# Patient Record
Sex: FEMALE | Race: BLACK OR AFRICAN AMERICAN | NOT HISPANIC OR LATINO | URBAN - METROPOLITAN AREA
[De-identification: names, ages, dates, MRNs, and addresses within clinical notes are randomized per-mention and may not be internally consistent; named-entity substitution may affect disease eponyms.]

---

## 2017-08-18 ENCOUNTER — COMMUNICATION - HEALTHEAST (OUTPATIENT)
Dept: FAMILY MEDICINE | Facility: CLINIC | Age: 27
End: 2017-08-18

## 2017-08-18 ENCOUNTER — OFFICE VISIT - HEALTHEAST (OUTPATIENT)
Dept: FAMILY MEDICINE | Facility: CLINIC | Age: 27
End: 2017-08-18

## 2017-08-18 ENCOUNTER — HOSPITAL ENCOUNTER (OUTPATIENT)
Dept: ULTRASOUND IMAGING | Facility: CLINIC | Age: 27
Discharge: HOME | End: 2017-08-18
Attending: NURSE PRACTITIONER

## 2017-08-18 DIAGNOSIS — Z13.220 LIPID SCREENING: ICD-10-CM

## 2017-08-18 DIAGNOSIS — R10.2 PELVIC PAIN: ICD-10-CM

## 2017-08-18 DIAGNOSIS — E66.3 OVERWEIGHT: ICD-10-CM

## 2017-08-18 DIAGNOSIS — B96.89 BACTERIAL VAGINOSIS: ICD-10-CM

## 2017-08-18 DIAGNOSIS — N76.0 BACTERIAL VAGINOSIS: ICD-10-CM

## 2017-08-18 DIAGNOSIS — Z00.00 ROUTINE GENERAL MEDICAL EXAMINATION AT A HEALTH CARE FACILITY: ICD-10-CM

## 2017-08-18 DIAGNOSIS — Z11.3 SCREENING FOR STD (SEXUALLY TRANSMITTED DISEASE): ICD-10-CM

## 2017-08-18 DIAGNOSIS — Z13.1 DIABETES MELLITUS SCREENING: ICD-10-CM

## 2017-08-18 LAB
CHOLEST SERPL-MCNC: 190 MG/DL
FASTING STATUS PATIENT QL REPORTED: YES
HDLC SERPL-MCNC: 56 MG/DL
HIV 1+2 AB+HIV1 P24 AG SERPL QL IA: NEGATIVE
LDLC SERPL CALC-MCNC: 123 MG/DL
TRIGL SERPL-MCNC: 56 MG/DL

## 2017-08-18 ASSESSMENT — MIFFLIN-ST. JEOR: SCORE: 1434.81

## 2017-08-21 LAB — SYPHILIS RPR SCREEN - HISTORICAL: NORMAL

## 2017-08-27 ENCOUNTER — AMBULATORY - HEALTHEAST (OUTPATIENT)
Dept: FAMILY MEDICINE | Facility: CLINIC | Age: 27
End: 2017-08-27

## 2017-08-27 DIAGNOSIS — R10.2 PELVIC PAIN: ICD-10-CM

## 2017-08-28 ENCOUNTER — COMMUNICATION - HEALTHEAST (OUTPATIENT)
Dept: FAMILY MEDICINE | Facility: CLINIC | Age: 27
End: 2017-08-28

## 2017-08-28 LAB
BKR LAB AP ABNORMAL BLEEDING: NO
BKR LAB AP BIRTH CONTROL/HORMONES: NORMAL
BKR LAB AP CERVICAL APPEARANCE: NORMAL
BKR LAB AP GYN ADEQUACY: NORMAL
BKR LAB AP GYN INTERPRETATION: NORMAL
BKR LAB AP GYN OTHER FINDINGS: NORMAL
BKR LAB AP HPV REFLEX: NORMAL
BKR LAB AP LMP: NORMAL
BKR LAB AP PATIENT STATUS: NORMAL
BKR LAB AP PREVIOUS ABNORMAL: NORMAL
BKR LAB AP PREVIOUS NORMAL: NORMAL
HIGH RISK?: NO
PATH REPORT.COMMENTS IMP SPEC: NORMAL
RESULT FLAG (HE HISTORICAL CONVERSION): NORMAL

## 2017-08-29 ENCOUNTER — COMMUNICATION - HEALTHEAST (OUTPATIENT)
Dept: FAMILY MEDICINE | Facility: CLINIC | Age: 27
End: 2017-08-29

## 2018-02-20 ENCOUNTER — OFFICE VISIT - HEALTHEAST (OUTPATIENT)
Dept: FAMILY MEDICINE | Facility: CLINIC | Age: 28
End: 2018-02-20

## 2018-02-20 ENCOUNTER — COMMUNICATION - HEALTHEAST (OUTPATIENT)
Dept: FAMILY MEDICINE | Facility: CLINIC | Age: 28
End: 2018-02-20

## 2018-02-20 ENCOUNTER — RECORDS - HEALTHEAST (OUTPATIENT)
Dept: ADMINISTRATIVE | Facility: OTHER | Age: 28
End: 2018-02-20

## 2018-02-20 DIAGNOSIS — B96.89 BACTERIAL VAGINOSIS: ICD-10-CM

## 2018-02-20 DIAGNOSIS — Z11.3 SCREENING FOR STD (SEXUALLY TRANSMITTED DISEASE): ICD-10-CM

## 2018-02-20 DIAGNOSIS — N76.0 BACTERIAL VAGINOSIS: ICD-10-CM

## 2018-02-20 DIAGNOSIS — N76.0 ACUTE VAGINITIS: ICD-10-CM

## 2018-02-20 LAB
CLUE CELLS: ABNORMAL
HIV 1+2 AB+HIV1 P24 AG SERPL QL IA: NEGATIVE
TRICHOMONAS, WET PREP: ABNORMAL
YEAST, WET PREP: ABNORMAL

## 2018-02-20 ASSESSMENT — MIFFLIN-ST. JEOR: SCORE: 1447.97

## 2018-02-21 LAB — SYPHILIS RPR SCREEN - HISTORICAL: NORMAL

## 2018-02-22 LAB
C TRACH DNA SPEC QL PROBE+SIG AMP: NEGATIVE
N GONORRHOEA DNA SPEC QL NAA+PROBE: POSITIVE

## 2018-02-23 ENCOUNTER — AMBULATORY - HEALTHEAST (OUTPATIENT)
Dept: FAMILY MEDICINE | Facility: CLINIC | Age: 28
End: 2018-02-23

## 2018-02-23 ENCOUNTER — COMMUNICATION - HEALTHEAST (OUTPATIENT)
Dept: FAMILY MEDICINE | Facility: CLINIC | Age: 28
End: 2018-02-23

## 2018-02-23 DIAGNOSIS — A54.9 GONORRHEA: ICD-10-CM

## 2018-02-28 ENCOUNTER — AMBULATORY - HEALTHEAST (OUTPATIENT)
Dept: NURSING | Facility: CLINIC | Age: 28
End: 2018-02-28

## 2018-03-09 ENCOUNTER — RECORDS - HEALTHEAST (OUTPATIENT)
Dept: ADMINISTRATIVE | Facility: OTHER | Age: 28
End: 2018-03-09

## 2020-02-06 ENCOUNTER — OFFICE VISIT - HEALTHEAST (OUTPATIENT)
Dept: FAMILY MEDICINE | Facility: CLINIC | Age: 30
End: 2020-02-06

## 2020-02-06 DIAGNOSIS — R39.9 FEMALE GENITOURINARY SYMPTOMS: ICD-10-CM

## 2020-02-06 LAB
ALBUMIN UR-MCNC: NEGATIVE MG/DL
APPEARANCE UR: CLEAR
BACTERIA #/AREA URNS HPF: ABNORMAL HPF
BILIRUB UR QL STRIP: NEGATIVE
CLUE CELLS: NORMAL
COLOR UR AUTO: YELLOW
GLUCOSE UR STRIP-MCNC: NEGATIVE MG/DL
HCG UR QL: NEGATIVE
HGB UR QL STRIP: ABNORMAL
KETONES UR STRIP-MCNC: NEGATIVE MG/DL
LEUKOCYTE ESTERASE UR QL STRIP: NEGATIVE
NITRATE UR QL: NEGATIVE
PH UR STRIP: 5.5 [PH] (ref 5–8)
RBC #/AREA URNS AUTO: ABNORMAL HPF
SP GR UR STRIP: >=1.03 (ref 1–1.03)
SQUAMOUS #/AREA URNS AUTO: ABNORMAL LPF
TRICHOMONAS, WET PREP: NORMAL
UROBILINOGEN UR STRIP-ACNC: ABNORMAL
WBC #/AREA URNS AUTO: ABNORMAL HPF
YEAST, WET PREP: NORMAL

## 2020-02-06 RX ORDER — ACETAMINOPHEN 500 MG
1000 TABLET ORAL
Status: SHIPPED | COMMUNITY
Start: 2018-03-09

## 2020-02-07 ENCOUNTER — COMMUNICATION - HEALTHEAST (OUTPATIENT)
Dept: FAMILY MEDICINE | Facility: CLINIC | Age: 30
End: 2020-02-07

## 2020-02-07 DIAGNOSIS — A74.9 CHLAMYDIA INFECTION: ICD-10-CM

## 2020-02-07 LAB
C TRACH DNA SPEC QL PROBE+SIG AMP: POSITIVE
N GONORRHOEA DNA SPEC QL NAA+PROBE: NEGATIVE

## 2020-03-01 ENCOUNTER — OFFICE VISIT - HEALTHEAST (OUTPATIENT)
Dept: FAMILY MEDICINE | Facility: CLINIC | Age: 30
End: 2020-03-01

## 2020-03-01 DIAGNOSIS — N76.0 BACTERIAL VAGINOSIS: ICD-10-CM

## 2020-03-01 DIAGNOSIS — Z11.3 SCREEN FOR STD (SEXUALLY TRANSMITTED DISEASE): ICD-10-CM

## 2020-03-01 DIAGNOSIS — B96.89 BACTERIAL VAGINOSIS: ICD-10-CM

## 2020-03-01 DIAGNOSIS — R10.2 SUPRAPUBIC PRESSURE: ICD-10-CM

## 2020-03-01 LAB
ALBUMIN UR-MCNC: NEGATIVE MG/DL
APPEARANCE UR: CLEAR
BILIRUB UR QL STRIP: NEGATIVE
CLUE CELLS: ABNORMAL
COLOR UR AUTO: YELLOW
GLUCOSE UR STRIP-MCNC: NEGATIVE MG/DL
HBV SURFACE AG SERPL QL IA: NEGATIVE
HGB UR QL STRIP: NEGATIVE
HIV 1+2 AB+HIV1 P24 AG SERPL QL IA: NEGATIVE
KETONES UR STRIP-MCNC: NEGATIVE MG/DL
LEUKOCYTE ESTERASE UR QL STRIP: NEGATIVE
NITRATE UR QL: NEGATIVE
PH UR STRIP: 5 [PH] (ref 5–8)
SP GR UR STRIP: 1.02 (ref 1–1.03)
TRICHOMONAS, WET PREP: ABNORMAL
UROBILINOGEN UR STRIP-ACNC: NORMAL
YEAST, WET PREP: ABNORMAL

## 2020-03-02 LAB
C TRACH DNA SPEC QL PROBE+SIG AMP: NEGATIVE
HCV AB SERPL QL IA: NEGATIVE
N GONORRHOEA DNA SPEC QL NAA+PROBE: NEGATIVE
T PALLIDUM AB SER QL: NEGATIVE

## 2020-12-28 ENCOUNTER — RECORDS - HEALTHEAST (OUTPATIENT)
Dept: LAB | Facility: CLINIC | Age: 30
End: 2020-12-28

## 2020-12-28 LAB
SARS-COV-2 PCR COMMENT: NORMAL
SARS-COV-2 RNA SPEC QL NAA+PROBE: NEGATIVE
SARS-COV-2 VIRUS SPECIMEN SOURCE: NORMAL

## 2021-05-01 ENCOUNTER — HEALTH MAINTENANCE LETTER (OUTPATIENT)
Age: 31
End: 2021-05-01

## 2021-05-31 VITALS — HEIGHT: 66 IN | WEIGHT: 156.1 LBS | BODY MASS INDEX: 25.09 KG/M2

## 2021-06-01 VITALS — WEIGHT: 159 LBS | BODY MASS INDEX: 25.55 KG/M2 | HEIGHT: 66 IN

## 2021-06-04 VITALS
SYSTOLIC BLOOD PRESSURE: 117 MMHG | DIASTOLIC BLOOD PRESSURE: 80 MMHG | BODY MASS INDEX: 25.18 KG/M2 | WEIGHT: 156 LBS | OXYGEN SATURATION: 100 % | RESPIRATION RATE: 16 BRPM | HEART RATE: 86 BPM | TEMPERATURE: 98.6 F

## 2021-06-04 VITALS
SYSTOLIC BLOOD PRESSURE: 132 MMHG | OXYGEN SATURATION: 99 % | BODY MASS INDEX: 25.63 KG/M2 | HEART RATE: 92 BPM | TEMPERATURE: 98.1 F | DIASTOLIC BLOOD PRESSURE: 85 MMHG | RESPIRATION RATE: 12 BRPM | WEIGHT: 158.8 LBS

## 2021-06-05 NOTE — TELEPHONE ENCOUNTER
Called patient.  Left her a voicemail requesting that she call back through [facility] at her earliest convenience.  When patient returns my call, she needs to be informed that:  1.  She has tested positive for Chlamydia but negative for Gonorrhea.  2.  A prescription for a 1-time dose of azithromycin 1000 mg has been sent to [facility] in [place].  She should take this antibiotic as soon as possible.  3.  She should contact any recent sexual partner(s) to inform them of possible exposure to Chlamydia.  4.  She should abstain from all sexual activity for at least 7 days after she and her partner(s) have been treated.    Roshan Salmeron MD 02/07/20 2:08 PM   Rx sent for macrobid

## 2021-06-05 NOTE — TELEPHONE ENCOUNTER
Patient Returning Call  Reason for call:  Return call.  Information relayed to patient:  Patient was informed of Dr. Salmeron's message below on her positive chlamydia result.  Patient has additional questions:  No  If YES, what are your questions/concerns:  n/a  Okay to leave a detailed message?: No call back needed

## 2021-06-05 NOTE — PROGRESS NOTES
"  Subjective:   Paola Miller is a 30 y.o. female  Roomed by: BARBY Woodall    Refills needed? No    Do you have any forms that need to be filled out? No      Chief Complaint   Patient presents with     Vaginal Bleeding     LMP: 20, started spotting on  (white and pinkish), started heavy bleeding last night, \"burning feeling in pelvic area off and on\"   Says that menstrual cycle goes from 28 - 31 days. Has been getting a burning feeling in the pelvic area lasting about 10 minutes without  Urination. Says the spotting stated our pinkish, then brownish and then bright red blood. Says currently not feeling the burning.  Admits that the burning sensation is on the top of her lower pelvic area in the pubic area.  Denies seeing any redness or rashes.  Denies any new clothes or bathing soap. Denies using any feminine sprays. Says she does not douche. Denies being on any hormones. She is sexually active and says she wants to get pregnant. Denies any recent fevers or chills. Denies recent urinary frequency, urgency, voiding in small amounts, and dysuria, fever, chills or flank pain.  Appetite has been less than usual. Admits being able to drink fluids and urinate normal amounts. Denies any recent nausea, vomiting, belly pain, or diarrhea. Admits feeling stressed. Denies any rashes.       Review of Systems  See HPI for ROS, otherwise balance of other systems negative    Allergies   Allergen Reactions     Seafood Unknown       Current Outpatient Medications:      acetaminophen (TYLENOL) 500 MG tablet, Take 1,000 mg by mouth., Disp: , Rfl:   Patient Active Problem List   Diagnosis     Vitamin D Deficiency     Nonspecific reaction to tuberculin skin test without active tuberculosis     Immunization deficiency     SAB (spontaneous )     H/O chlamydia infection     No past medical history on file. - if none on file, see Problem List    Objective:     Vitals:    20 1514 20 1517   BP: 149/82 132/85 "   Pulse: 92    Resp: 12    Temp: 98.1  F (36.7  C)    TempSrc: Oral    SpO2: 99%    Weight: 158 lb 12.8 oz (72 kg)    Gen - Pt in NAD   Exam:  External Genitalia including pubic area - no redness, rashes, masses or ulcerations, no induration  Vagina -small amount of slightly bloody/ brownish discharge  No CMT    Results for orders placed or performed in visit on 02/06/20   Wet Prep, Vaginal   Result Value Ref Range    Yeast Result No yeast seen No yeast seen    Trichomonas No Trichomonas seen No Trichomonas seen    Clue Cells, Wet Prep No Clue cells seen No Clue cells seen   Pregnancy (Beta-hCG, Qual), Urine   Result Value Ref Range    Pregnancy Test, Urine Negative Negative   Urinalysis-UC if Indicated   Result Value Ref Range    Color, UA Yellow Colorless, Yellow, Straw, Light Yellow    Clarity, UA Clear Clear    Glucose, UA Negative Negative    Bilirubin, UA Negative Negative    Ketones, UA Negative Negative    Specific Gravity, UA >=1.030 1.005 - 1.030    Blood, UA Large (!) Negative    pH, UA 5.5 5.0 - 8.0    Protein, UA Negative Negative mg/dL    Urobilinogen, UA 0.2 E.U./dL 0.2 E.U./dL, 1.0 E.U./dL    Nitrite, UA Negative Negative    Leukocytes, UA Negative Negative    Bacteria, UA Few (!) None Seen hpf    RBC, UA 5-10 (!) None Seen, 0-2 hpf    WBC, UA 0-5 None Seen, 0-5 hpf    Squam Epithel, UA 0-5 None Seen, 0-5 lpf   Lab result discussed on day of visit.     Assessment - Plan   Medical Decision Making -patient presents with LMP pain 1/11 and then started have some spotting with heavy bleeding starting on 1/26.  Also complains of burning sensation in her pubic area.  On exam she is afebrile and vital signs are stable.  There was no abnormality noted in her pubic area.  She did have some bloody/brownish drainage.  Wet prep was negative.  Urinalysis showed blood but no indication of a infectious etiology and UTI PT was negative.  Discussed with patient that I could not find a reason for her symptoms that  she needs to follow-up with either her primary care provider or gynecologist.  GC and Chlamydia are pending.    1. Female genitourinary symptoms  - Wet Prep, Vaginal  - Pregnancy (Beta-hCG, Qual), Urine  - Urinalysis-UC if Indicated  - Chlamydia trachomatis & Neisseria gonorrhoeae, Amplified Detection    At the conclusion of the encounter, assessment and plan were discussed.   All questions were answered.   The patient or guardian acknowledged understanding and was involved in the decision making regarding the overall care plan.    Patient Instructions   1. Make amn appointment for follow up with primary care provider or gynecologist within the next couple weeks  2. If symptoms are getting worse over time or you have any questions, call the clinic number - it's answered 24/7

## 2021-06-05 NOTE — PATIENT INSTRUCTIONS - HE
1. Make amn appointment for follow up with primary care provider or gynecologist within the next couple weeks  2. If symptoms are getting worse over time or you have any questions, call the clinic number - it's answered 24/7

## 2021-06-12 NOTE — PROGRESS NOTES
Assessment and Plan:      1. Routine general medical examination at a health care facility  Discussed consuming a healthy diet and exercising.  Discussed importance of routine sunscreen.  Recommend taking a daily multivitamin.  She is not interested in birth control today.  Updated TD vaccine.  - Gynecologic Cytology (PAP Smear)  - Hemoglobin  - Td, Adult, Adsorbed (blue label)    2. Diabetes mellitus screening  - Glucose    3. Lipid screening  - Lipid [place]    4. Screening for STD (sexually transmitted disease)  Discussed safe sex practices.  Will notify patient of results.  Again, she declines birth control options.  - Chlamydia trachomatis & Neisseria gonorrhoeae, Amplified Detection  - Syphilis Screen, [place]  - HIV Antigen/Antibody Screening [place]    5. Overweight  The following high BMI interventions were performed this visit: exercise promotion: strength training, exercise promotion: stretching and nutrition therapy    6. Pelvic pain  We will rule out vaginal infection and STDs.  Patient does have pain today, so will obtain pelvic ultrasound for further evaluation.  She could certainly be experiencing mittelschmerz due to the time of months she is experiencing the discomfort.  Discussed symptomatic treatment.  Further plans pending the results.  - Wet Prep, Vaginal  - Chlamydia trachomatis & Neisseria gonorrhoeae, Amplified Detection  - US Pelvis With Transvaginal Non OB; Future    7. Bacterial vaginosis  We will treat with metronidazole 500 mg twice daily for 7 days.  She is instructed to avoid alcohol.  Patient states she obtains yeast infections when she has a vaginal infection.  Will treat with Diflucan as needed.  She is content with the plan.  - metroNIDAZOLE (FLAGYL) 500 MG tablet; Take 1 tablet (500 mg total) by mouth 2 (two) times a day for 7 days.  Dispense: 14 tablet; Refill: 0  - fluconazole (DIFLUCAN) 150 MG tablet; Take 1 tablet (150 mg total) by mouth once for 1 dose.  Dispense: 1 tablet;  Refill: 1        Subjective:     Paola is a 27 y.o. female presenting to the clinic for a female physical.     LMP: 7/27/17, regular once/month  Hx of abnormal pap smear: none   Last pap smear: unsure   Perform self-breast exams: none   Vaginal discharge or irritation: none   Sexually active: single  Contraception: none   Concerns for STDs: yes, would like to be checked   Previous pregnancies:one miscarriage in 2015     She complains of pelvic pain intermittently for 6 months.  States that occurs primarily 2 weeks before her menstrual period.  It is intermittent throughout the day lasting approximately 15 minutes.  It lasts for 1-2 days.  She denies vaginal discharge or irritation.  She is sexually active and single.  She is concerned for STDs.  She denies fever.  She denies constipation, diarrhea, dysuria, hematuria, low back pain.  Her last menstrual period was 7/27/17.  Her periods occur once per month.    Review of systems:  I performed a 10 point review of systems.  All pertinent positives and negatives are noted in the HPI. All others are negative.     No Known Allergies    Current Outpatient Prescriptions on File Prior to Visit   Medication Sig Dispense Refill     [DISCONTINUED] metroNIDAZOLE (FLAGYL) 500 MG tablet TAKE 1 T PO 2 TIMES A DAY FOR 7 DAYS  0     [DISCONTINUED] PNV with calcium no.72-iron-FA 27 mg iron- 1 mg Tab Take 1 tablet by mouth daily. 90 tablet 4     [DISCONTINUED] terconazole (TERAZOL 7) 0.4 % vaginal cream Use bid for 7 days. 45 g 0     No current facility-administered medications on file prior to visit.        Social History     Social History     Marital status: Single     Spouse name: N/A     Number of children: N/A     Years of education: N/A     Occupational History     student      Social History Main Topics     Smoking status: Never Smoker     Smokeless tobacco: Never Used     Alcohol use No     Drug use: No     Sexual activity: Yes     Partners: Male     Other Topics Concern      Not on file     Social History Narrative    Patient lives at home with family. Patient is a student and works.        History reviewed. No pertinent past medical history.    Family History   Problem Relation Age of Onset     Hypertension Mother      Diabetes Mother      Heart failure Mother      surgery 8/2011     Diabetes Father      Hypertension Maternal Grandmother        History reviewed. No pertinent surgical history.    Objective:     Vitals:    08/18/17 0939   BP: 120/62   Pulse: 84   SpO2: 98%       Patient is alert, no obvious distress.   Skin: Warm, dry.  No rashes or lesions. Skin turgor rapid return.   HEENT:  Eyes normal.  Ears normal.  Nose patent, mucosa pink.  Oropharynx mucosa pink, no lesions or tonsil enlargement.   Neck:  Supple, without lymphadenopathy, bruits, JVD. Thyroid normal texture and size.    Lungs:  Clear to auscultation.  No wheezing, rales noted.  Respirations even and unlabored.   Heart:  Regular rate and rhythm.  No murmurs.   Breasts:  Normal.  No surrounding adenopathy.   Abdomen: Soft, nontender.  No organomegaly.  Bowel sounds normoactive.  No guarding or masses noted.   :  External genitalia normal.  Normal vaginal mucosa.  Moderate amount of white, thick discharge is present. Cervix no lesions or cervical motion tenderness. Uterus normal size, no masses.  Adnexa no masses palpated bilaterally.   Musculoskeletal:  Full ROM of extremities.  Muscle strength equal +5/5.   Neurological:  Cranial nerves 2-12 intact.

## 2021-06-16 NOTE — PROGRESS NOTES
Assessment and Plan:     1. Bacterial vaginosis  clindamycin (CLEOCIN) 300 MG capsule   2. Acute vaginitis  Wet Prep, Vaginal   3. Screening for STD (sexually transmitted disease)  Chlamydia trachomatis & Neisseria gonorrhoeae, Amplified Detection    HIV Antigen/Antibody Screening [place]    Syphilis Screen, [place]     We will treat with clindamycin 300 mg twice daily for 7 days.  Educated on its indications and side effects.  Discussed symptomatic treatment.  Discussed safe sex practices.  Will notify patient of results.  She is content with the plan.    Subjective:     Paola is a 28 y.o. female presenting to the clinic for concerns for possible vaginal infection.  Patient developed vaginal discharge with an odor 2 weeks ago.  She also complains of a sharp pain within her vagina.  She has noticed some vaginal itching and irritation.  She is concerned for STDs and would like STD screening today.  She has been in a relationship for 4 years.  They do not use condoms.  She has not tried any over-the-counter products for her symptoms.    Review of Systems: A complete 14 point review of systems was obtained and is negative or as stated in the history of present illness.    Social History     Social History     Marital status: Single     Spouse name: N/A     Number of children: N/A     Years of education: N/A     Occupational History     student      Social History Main Topics     Smoking status: Never Smoker     Smokeless tobacco: Never Used     Alcohol use No     Drug use: No     Sexual activity: Yes     Partners: Male     Other Topics Concern     Not on file     Social History Narrative    Patient lives at home with family. Patient is a student and works.        Active Ambulatory Problems     Diagnosis Date Noted     Vitamin D Deficiency      Tuberculin PPD Induration Positive Interpretation      Immunization deficiency 2015     SAB (spontaneous ) 2015     H/O chlamydia infection 2015  "    Resolved Ambulatory Problems     Diagnosis Date Noted     Pregnancy 01/20/2015     No Additional Past Medical History       Family History   Problem Relation Age of Onset     Hypertension Mother      Diabetes Mother      Heart failure Mother      surgery 8/2011     Diabetes Father      Hypertension Maternal Grandmother        Objective:     /72  Pulse 74  Ht 5' 6\" (1.676 m)  Wt 159 lb (72.1 kg)  LMP 01/22/2018  BMI 25.66 kg/m2    Patient is alert, in no obvious distress.   Skin: Warm, dry.    Lungs:  Clear to auscultation. Respirations even and unlabored.  No wheezing or rales noted.   Heart:  Regular rate and rhythm.  No murmurs, S3, S4, gallops, or rubs.    Abdomen: Soft, nontender.  No organomegaly. Bowel sounds normoactive. No guarding or masses noted.   :  External genitalia normal.  Normal vaginal mucosa.  Slight white vaginal discharge is present.  Cervix no lesions or cervical motion tenderness.      LABORATORY: Wet prep, gonorrhea, chlamydia, HIV, syphilis ordered.            "

## 2021-06-18 NOTE — PATIENT INSTRUCTIONS - HE
Patient Instructions by Jessica Al PA-C at 3/1/2020  1:20 PM     Author: Jessica Al PA-C Service: -- Author Type: Physician Assistant    Filed: 3/1/2020  2:38 PM Encounter Date: 3/1/2020 Status: Signed    : Jessica Al PA-C (Physician Assistant)       Patient Education     Bacterial Vaginosis    You have a vaginal infection called bacterial vaginosis (BV). Both good and bad bacteria are present in a healthy vagina. BV occurs when these bacteria get out of balance. The number of bad bacteria increase. And the number of good bacteria decrease. Although BV is associated with sexual activity, it is not a sexually transmitted disease.  BV may or may not cause symptoms. If symptoms do occur, they can include:    Thin, gray, milky-white, or sometimes green discharge    Unpleasant odor or fishy smell    Itching, burning, or pain in or around the vagina  It is not known what causes BV, but certain factors can make the problem more likely. This can include:    Douching    Having sex with a new partner    Having sex with more than one partner  BV will sometimes go away on its own. But treatment is usually recommended. This is because untreated BV can increase the risk of more serious health problems such as:    Pelvic inflammatory disease (PID)     delivery (giving birth to a baby early if youre pregnant)    HIV and certain other sexually transmitted diseases (STDs)    Infection after surgery on the reproductive organs  Home care  General care    BV is most often treated with medicines called antibiotics. These may be given as pills or as a vaginal cream. If antibiotics are prescribed, be sure to use them exactly as directed. Also, be sure to complete all of the medicine, even if your symptoms go away.    Don't douche or having sex during treatment.    If you have sex with a female partner, ask your healthcare provider if she should also be treated.  Prevention    Don't douche.    Don't  have sex. If you do have sex, then take steps to lower your risk:  ? Use condoms when having sex.  ? Limit the number of sexual partners you have.  Follow-up care  Follow up with your healthcare provider, or as advised.  When to seek medical advice  Call your healthcare provider right away if:    You have a fever of 100.4 F (38 C) or higher, or as directed by your provider.    Your symptoms worsen, or they dont go away within a few days of starting treatment.    You have new pain in the lower belly or pelvic region.    You have side effects that bother you or a reaction to the pills or cream youre prescribed.    You or any partners you have sex with have new symptoms, such as a rash, joint pain, or sores.  Date Last Reviewed: 10/1/2017    [id number] [employer]. [address] [place] [address]. All rights reserved. This information is not intended as a substitute for professional medical care. Always follow your healthcare professional's instructions.

## 2021-06-28 NOTE — PROGRESS NOTES
Progress Notes by Jessica Al PA-C at 3/1/2020  1:20 PM     Author: Jessica Al PA-C Service: -- Author Type: Physician Assistant    Filed: 3/6/2020  9:01 AM Encounter Date: 3/1/2020 Status: Signed    : Jessica Al PA-C (Physician Assistant)         Assessment & Plan:       1. Bacterial vaginosis  Wet Prep, Vaginal    Chlamydia trachomatis & Neisseria gonorrhoeae, Amplified Detection    Hepatitis B Surface Antigen (HBsAG)    Hepatitis C Antibody (Anti-HCV)    HIV Antigen/Antibody Diagnostic [place]    Syphilis Screen, [place]    metroNIDAZOLE (FLAGYL) 500 MG tablet   2. Suprapubic pressure  Urinalysis   3. Screen for STD (sexually transmitted disease)       Medical Decision Making  Patient presents for ongoing vaginal discharge following a diagnosis of chlamydia on 2/7.  Wet prep was positive for bacterial vaginosis.  Will treat with oral Flagyl.  Did also consider patient may have a urinary tract infection, however UA is negative at this time.  STD screening is in process to recheck for chlamydia and other STD panel.  Discussed signs of worsening symptoms and when to follow-up with PCP if no symptom improvement.    Patient Instructions   Patient Education     Bacterial Vaginosis    You have a vaginal infection called bacterial vaginosis (BV). Both good and bad bacteria are present in a healthy vagina. BV occurs when these bacteria get out of balance. The number of bad bacteria increase. And the number of good bacteria decrease. Although BV is associated with sexual activity, it is not a sexually transmitted disease.  BV may or may not cause symptoms. If symptoms do occur, they can include:    Thin, gray, milky-white, or sometimes green discharge    Unpleasant odor or fishy smell    Itching, burning, or pain in or around the vagina  It is not known what causes BV, but certain factors can make the problem more likely. This can include:    Douching    Having sex with a new  partner    Having sex with more than one partner  BV will sometimes go away on its own. But treatment is usually recommended. This is because untreated BV can increase the risk of more serious health problems such as:    Pelvic inflammatory disease (PID)     delivery (giving birth to a baby early if youre pregnant)    HIV and certain other sexually transmitted diseases (STDs)    Infection after surgery on the reproductive organs  Home care  General care    BV is most often treated with medicines called antibiotics. These may be given as pills or as a vaginal cream. If antibiotics are prescribed, be sure to use them exactly as directed. Also, be sure to complete all of the medicine, even if your symptoms go away.    Don't douche or having sex during treatment.    If you have sex with a female partner, ask your healthcare provider if she should also be treated.  Prevention    Don't douche.    Don't have sex. If you do have sex, then take steps to lower your risk:  ? Use condoms when having sex.  ? Limit the number of sexual partners you have.  Follow-up care  Follow up with your healthcare provider, or as advised.  When to seek medical advice  Call your healthcare provider right away if:    You have a fever of 100.4 F (38 C) or higher, or as directed by your provider.    Your symptoms worsen, or they dont go away within a few days of starting treatment.    You have new pain in the lower belly or pelvic region.    You have side effects that bother you or a reaction to the pills or cream youre prescribed.    You or any partners you have sex with have new symptoms, such as a rash, joint pain, or sores.  Date Last Reviewed: 10/1/2017    9471-2024 [employer]. [address] [place] [address], [place] [address]. All rights reserved. This information is not intended as a substitute for professional medical care. Always follow your healthcare professional's instructions.                 Subjective:       Paola CARROLL  Angela is a 30 y.o. female here for evaluation of vaginal discharge.  Patient was seen 2/7 and was diagnosed with chlamydia at that time.  She took the 1 g azithromycin that was prescribed, but noted no improvement in symptoms.  She still has ongoing vaginal discharge, vaginal discomfort, and vaginal itchiness.  Patient otherwise denies fevers, nausea, vomiting, back pains, and abdominal pains.    The following portions of the patient's history were reviewed and updated as appropriate: allergies, current medications and problem list.    Review of Systems  Pertinent items are noted in HPI.     Allergies  Allergies   Allergen Reactions   ? Seafood Unknown       Family History   Problem Relation Age of Onset   ? Hypertension Mother    ? Diabetes Mother    ? Heart failure Mother         surgery 8/2011   ? Diabetes Father    ? Hypertension Maternal Grandmother        Social History     Socioeconomic History   ? Marital status: Single     Spouse name: None   ? Number of children: None   ? Years of education: None   ? Highest education level: None   Occupational History   ? Occupation: student   Social Needs   ? Financial resource strain: None   ? Food insecurity:     Worry: None     Inability: None   ? Transportation needs:     Medical: None     Non-medical: None   Tobacco Use   ? Smoking status: Never Smoker   ? Smokeless tobacco: Never Used   Substance and Sexual Activity   ? Alcohol use: No   ? Drug use: No   ? Sexual activity: Yes     Partners: Male   Lifestyle   ? Physical activity:     Days per week: None     Minutes per session: None   ? Stress: None   Relationships   ? Social connections:     Talks on phone: None     Gets together: None     Attends Buddhism service: None     Active member of club or organization: None     Attends meetings of clubs or organizations: None     Relationship status: None   ? Intimate partner violence:     Fear of current or ex partner: None     Emotionally abused: None     Physically  abused: None     Forced sexual activity: None   Other Topics Concern   ? None   Social History Narrative    Patient lives at home with family. Patient is a student and works.          Objective:       /80   Pulse 86   Temp 98.6  F (37  C) (Oral)   Resp 16   Wt 156 lb (70.8 kg)   LMP 02/06/2020   SpO2 100%   BMI 25.18 kg/m    General appearance: alert, appears stated age, cooperative, no distress and non-toxic     Lab Results    Recent Results (from the past 24 hour(s))   Urinalysis   Result Value Ref Range    Color, UA Yellow Colorless, Yellow, Straw, Light Yellow    Clarity, UA Clear Clear    Glucose, UA Negative Negative    Bilirubin, UA Negative Negative    Ketones, UA Negative Negative    Specific Gravity, UA 1.025 1.005 - 1.030    Blood, UA Negative Negative    pH, UA 5.0 5.0 - 8.0    Protein, UA Negative Negative mg/dL    Urobilinogen, UA 0.2 E.U./dL 0.2 E.U./dL, 1.0 E.U./dL    Nitrite, UA Negative Negative    Leukocytes, UA Negative Negative   Wet Prep, Vaginal   Result Value Ref Range    Yeast Result No yeast seen No yeast seen    Trichomonas No Trichomonas seen No Trichomonas seen    Clue Cells, Wet Prep Clue cells seen (!) No Clue cells seen     I personally reviewed these results and discussed findings with the patient.

## 2021-10-16 ENCOUNTER — HEALTH MAINTENANCE LETTER (OUTPATIENT)
Age: 31
End: 2021-10-16

## 2022-05-22 ENCOUNTER — HEALTH MAINTENANCE LETTER (OUTPATIENT)
Age: 32
End: 2022-05-22

## 2022-09-25 ENCOUNTER — HEALTH MAINTENANCE LETTER (OUTPATIENT)
Age: 32
End: 2022-09-25

## 2023-06-04 ENCOUNTER — HEALTH MAINTENANCE LETTER (OUTPATIENT)
Age: 33
End: 2023-06-04